# Patient Record
Sex: MALE | Race: WHITE | NOT HISPANIC OR LATINO
[De-identification: names, ages, dates, MRNs, and addresses within clinical notes are randomized per-mention and may not be internally consistent; named-entity substitution may affect disease eponyms.]

---

## 2023-11-15 ENCOUNTER — APPOINTMENT (OUTPATIENT)
Dept: HEART AND VASCULAR | Facility: CLINIC | Age: 58
End: 2023-11-15
Payer: COMMERCIAL

## 2023-11-15 VITALS
BODY MASS INDEX: 32.2 KG/M2 | HEART RATE: 81 BPM | OXYGEN SATURATION: 97 % | WEIGHT: 230 LBS | DIASTOLIC BLOOD PRESSURE: 101 MMHG | HEIGHT: 71 IN | SYSTOLIC BLOOD PRESSURE: 158 MMHG

## 2023-11-15 VITALS — DIASTOLIC BLOOD PRESSURE: 97 MMHG | SYSTOLIC BLOOD PRESSURE: 161 MMHG

## 2023-11-15 DIAGNOSIS — Z82.49 FAMILY HISTORY OF ISCHEMIC HEART DISEASE AND OTHER DISEASES OF THE CIRCULATORY SYSTEM: ICD-10-CM

## 2023-11-15 DIAGNOSIS — Z78.9 OTHER SPECIFIED HEALTH STATUS: ICD-10-CM

## 2023-11-15 DIAGNOSIS — F17.290 NICOTINE DEPENDENCE, OTHER TOBACCO PRODUCT, UNCOMPLICATED: ICD-10-CM

## 2023-11-15 DIAGNOSIS — Z83.3 FAMILY HISTORY OF DIABETES MELLITUS: ICD-10-CM

## 2023-11-15 PROBLEM — Z00.00 ENCOUNTER FOR PREVENTIVE HEALTH EXAMINATION: Status: ACTIVE | Noted: 2023-11-15

## 2023-11-15 PROCEDURE — 93000 ELECTROCARDIOGRAM COMPLETE: CPT

## 2023-11-15 PROCEDURE — 99204 OFFICE O/P NEW MOD 45 MIN: CPT | Mod: 25

## 2023-11-15 RX ORDER — LOSARTAN POTASSIUM 50 MG/1
50 TABLET, FILM COATED ORAL DAILY
Qty: 90 | Refills: 3 | Status: ACTIVE | COMMUNITY
Start: 2023-11-15 | End: 1900-01-01

## 2023-12-15 ENCOUNTER — RESULT REVIEW (OUTPATIENT)
Age: 58
End: 2023-12-15

## 2023-12-18 ENCOUNTER — RESULT REVIEW (OUTPATIENT)
Age: 58
End: 2023-12-18

## 2023-12-27 ENCOUNTER — APPOINTMENT (OUTPATIENT)
Dept: HEART AND VASCULAR | Facility: CLINIC | Age: 58
End: 2023-12-27
Payer: COMMERCIAL

## 2023-12-27 VITALS
HEART RATE: 70 BPM | SYSTOLIC BLOOD PRESSURE: 138 MMHG | WEIGHT: 215 LBS | DIASTOLIC BLOOD PRESSURE: 93 MMHG | BODY MASS INDEX: 30.1 KG/M2 | HEIGHT: 71 IN

## 2023-12-27 PROCEDURE — 99214 OFFICE O/P EST MOD 30 MIN: CPT | Mod: 25

## 2023-12-27 PROCEDURE — 93000 ELECTROCARDIOGRAM COMPLETE: CPT

## 2023-12-27 PROCEDURE — 93306 TTE W/DOPPLER COMPLETE: CPT

## 2023-12-27 RX ORDER — METOPROLOL SUCCINATE 25 MG/1
25 TABLET, EXTENDED RELEASE ORAL DAILY
Qty: 90 | Refills: 3 | Status: ACTIVE | COMMUNITY
Start: 2023-12-14 | End: 1900-01-01

## 2023-12-27 RX ORDER — ROSUVASTATIN CALCIUM 40 MG/1
40 TABLET, FILM COATED ORAL
Qty: 90 | Refills: 3 | Status: ACTIVE | COMMUNITY
Start: 2023-12-27 | End: 1900-01-01

## 2023-12-27 RX ORDER — ASPIRIN 81 MG/1
81 TABLET, CHEWABLE ORAL
Qty: 90 | Refills: 3 | Status: ACTIVE | COMMUNITY
Start: 2023-12-27 | End: 1900-01-01

## 2023-12-27 NOTE — HISTORY OF PRESENT ILLNESS
[FreeTextEntry1] : 58 year old male, occasional cigar smoker with PMHX of HTN ( not medicated ) with FMHX of MI / DM ( father passed 73 yo ) here to establish care.   Patient was recently noted for elevated blood pressure and requires annual clearance to work as an . He currently does not have a PCP. Previous PCP Dr Vivek Tafoya recently retired. He had recent blood work done about 3 months.   Patient is pretty active. He is currently swimming twice a week for about 20 minutes. He can get out of breath if rushing 3 flights of stairs. He has no associated chest pains. He has no edema or orthopnea. He has no recent bleeding or recent illnesses.   Previous Work up  LABS ( 06/2023 ): Total Chol 214, HDL 36,

## 2023-12-27 NOTE — DISCUSSION/SUMMARY
[FreeTextEntry1] : 58 year old male, occasional cigar smoker with PMHX of HTN ( not medicated ) with FMHX of MI / DM ( father passed 73 yo ) here to establish care.  EKG: NSR 81/min CCTA:  Total Agatston Score: 113 .  LM Agatston Score: 0 . LAD Agatston Score: 48 . LCX Agatston Score: 22. RCA Agatston Score: 43.  FERRER Percentile Rank: 70 (The observed calcium score of 113 is at percentile 70 for subjects of the same age, gender, and race/ethnicity who are free of clinical cardiovascular disease and treated diabetes.)  B: Coronary Artery CT Angiogram  Coronary Dominance: Right Yes Anomalous coronary artery:  No Coronary fistula: No  Coronary Arteries (segment number):  LM (5): Normal LAD: Prox (6): Moderate stenosis secondary to noncalcific and calcific plaque. Mid (7): Mild stenosis due to noncalcific plaque Distal (8): Moderate stenosis due to noncalcific plaque D1 (9): High takeoff. Nonobstructive disease. D2 (10): Normal  LCX: Prox (11): Mild stenosis due to noncalcific and calcific plaque OM1(12): Mild stenosis due to noncalcific and calcific plaque Mid (13): Mild stenosis due to noncalcific plaque OM2 (14): n/a  LPDA (15): n/a RI (17): n/a LPL (18): n/a  RCA: Prox (1): Moderate stenosis due to noncalcific and calcific plaque. Positive remodeling noted. Mid (2): Moderate stenosis due to predominantly noncalcific plaque Distal (3): Moderate stenosis due to predominantly noncalcific plaque RPDA (4): Nonobstructive disease. RPL (16): Small vessel.    {% area stenosis:   Normal = 0%;  Minimal = 1 to 29%;  Mild = 30 to 49%;  Moderate= 50 to 69%;  Severe= 70 to 90%; Subtotal > or = 91%     } Lesion type:  Calcified;  Noncalcified;  Predominantly Calcified;  Predominantly Noncalcified;  With Remodeling  C: Additional Cardiac Findings Coarctation: N Patent ductus arteriosus: N Aortic Dissection:  None in the visualized segments of the thoracic aorta. Myocardial structural abnormality: LV:    N LA:    N RV:   N RA:   N Atrial septum:  N Ventricular septum:  N Pericardium:  N Pericardial effusion: N Myocardial Infarction (myocardial thinning and low-attenuation):  Non Cardiac Findings:  Limited CT of the CHEST with and without contrast  INDICATION: Chest pain.  TECHNIQUE: CT of the medial aspects of the mid to lower chest was obtained in conjunction with a coronary CT angiogram.  The coronary CT angiogram is dictated separately.  This report pertains solely to the non-coronary portions of the study.  CONTRAST: Rapid bolused uneventful intravenous administration of 65 cc of Isovue-370. 0 cc were discarded.  PRIOR STUDIES: None.  FINDINGS: The heart is normal in size.  No pericardial effusion is seen.   The visualized portions of the aorta are unremarkable.   The visualized portions of the pulmonary arteries are unremarkable. No gross mediastinal or hilar adenopathy identified.  No pleural effusions are evident. No pulmonary abnormalities are seen.  Upper abdomen and soft tissues demonstrate mild hepatic steatosis otherwise unremarkable enhanced appearance. Osseous structures are intact.  IMPRESSION:  Cardiac: 1.  The calcium score is moderate at 113 Agatston units, which is at the 70 percentile, adjusted for age, gender and race. 2.  Moderate stenosis in the proximal and distal LAD. 3.  Moderate stenosis in the proximal RCA with positive remodeling features. 4.  Moderate stenosis in the mid and distal RCA. 5.  Normal Left Main. 6.  Nonobstructive disease in the LCX.  Based on the coronary findings, FFR-CT Coronary Analysis ([employer]) is recommended to define the physiologic  significance of obstructive coronary disease. After insurance approval, images will be sent for analysis.  Results will then be available in 24 hours( if technical quality of the images is sufficient).    Non-cardiac: 1. Mild hepatic steatosis otherwise unremarkable examination.  IMPRESSION: The results of the CT-FFR analysis are:  LAD: 0.93 Proximal (1); 0.66 distal (3) LCx: 0.92 (1) RCA: 0.99 proximal (1); 0.97 mid (1); 0.95 distal (1)   CAD/WARNER - CCS 3 - ang. equivalent ?- start ASA 81mg po daily and Plavix 75mg po daily. r/b/a of L/PCI d/w pt including, but not ltd to death, MI, CVA/ Pt wishes to proceed. cont metoprolol 25mg po daily. HTN: cont losartan 50mg po daily. HLD: - start rosuvastatin 40mg po daily RTC 3 weeks

## 2023-12-27 NOTE — REVIEW OF SYSTEMS
[Dyspnea on exertion] : dyspnea during exertion [Fever] : no fever [Chills] : no chills [Blurry Vision] : no blurred vision [Earache] : no earache [Chest Discomfort] : no chest discomfort [Lower Ext Edema] : no extremity edema [Palpitations] : no palpitations [Cough] : no cough [Abdominal Pain] : no abdominal pain [Nausea] : no nausea [Vomiting] : no vomiting [Diarrhea] : diarrhea [Urinary Frequency] : no change in urinary frequency [Joint Pain] : no joint pain [Rash] : no rash [Dizziness] : no dizziness [Numbness (Hypoesthesia)] : no numbness [Weakness] : no weakness [Speech Disturbance] : no speech disturbance [Confusion] : no confusion was observed [Easy Bleeding] : no tendency for easy bleeding

## 2024-01-11 VITALS
OXYGEN SATURATION: 99 % | SYSTOLIC BLOOD PRESSURE: 139 MMHG | HEART RATE: 66 BPM | WEIGHT: 250 LBS | TEMPERATURE: 97 F | DIASTOLIC BLOOD PRESSURE: 85 MMHG | HEIGHT: 64 IN | RESPIRATION RATE: 16 BRPM

## 2024-01-11 RX ORDER — CHLORHEXIDINE GLUCONATE 213 G/1000ML
1 SOLUTION TOPICAL ONCE
Refills: 0 | Status: DISCONTINUED | OUTPATIENT
Start: 2024-01-16 | End: 2024-01-30

## 2024-01-11 NOTE — H&P ADULT - ASSESSMENT
59 y/o M, with family hx of MI/DM (father), and PMHx of HTN, who presented to his cardiologist, Dr. Kunz, for annual clearance to work as an . Patient is pretty active - he currently swims twice a week for about 20 minutes. Patient also complains of getting out of breath if rushing 3 flights of stairs. He has no associated CP. CCTA as per MD note: Calcium score is moderate at 113 Agatston units, which is at the 70th percentile, adjusted for age, gender, and race. moderate stenosis in the proximal and distal LAD. Moderate stenosis in the proximal RCA with positive remodeling features. Moderate stenosis in the mid and distal RCA. Normal LM. Nonobstructive disease in the LCx. In light of patient's risk factors, CCS class II angina equivalent symptoms, and abnormal CCTA, pt to undergo cardiac catheterization with possible intervention if clinically indicated.     EKG: 	NSR 66bpm		  ASA 	III  Mallampati class: III  Anginal Class: III    -No Known Allergies    -H/H = 15.5/46.4  . Pt denies BRBPR, hematuria, hematochezia, melena. Pt endorses compliance w/ home ASA, stating last dose was 1/16. Pt loaded w/ Plavix 600mg x1  -BUN/Cr = 14/0.83  . EF unknown. Euvolemic on exam. IV NS @ 250cc bolus followed by 75cc/hr x 2hrs started pre procedure    Sedation Plan:   Moderate  Patient Is Suitable Candidate For Sedation?     Yes    Risks & benefits of procedure and alternative therapy have been explained to the patient including but not limited to: allergic reaction, bleeding with possible need for blood transfusion, infection, renal and vascular compromise, limb damage, arrhythmia, stroke, vessel dissection/perforation, myocardial infarction, and emergent CABG. Informed consent obtained at bedside and included in chart. 57 y/o M, with family hx of MI/DM (father), and PMHx of HTN, who presented to his cardiologist, Dr. Kunz, for annual clearance to work as an . Patient is pretty active - he currently swims twice a week for about 20 minutes. Patient also complains of getting out of breath if rushing 3 flights of stairs. He has no associated CP. CCTA as per MD note: Calcium score is moderate at 113 Agatston units, which is at the 70th percentile, adjusted for age, gender, and race. moderate stenosis in the proximal and distal LAD. Moderate stenosis in the proximal RCA with positive remodeling features. Moderate stenosis in the mid and distal RCA. Normal LM. Nonobstructive disease in the LCx. In light of patient's risk factors, CCS class II angina equivalent symptoms, and abnormal CCTA, pt to undergo cardiac catheterization with possible intervention if clinically indicated.     EKG: 	NSR 66bpm		  ASA 	III  Mallampati class: III  Anginal Class: III    -No Known Allergies    -H/H = 15.5/46.4  . Pt denies BRBPR, hematuria, hematochezia, melena. Pt endorses compliance w/ home ASA, stating last dose was 1/16. Pt loaded w/ Plavix 600mg x1  -BUN/Cr = 14/0.83  . EF unknown. Euvolemic on exam. IV NS @ 250cc bolus followed by 75cc/hr x 2hrs started pre procedure    Sedation Plan:   Moderate  Patient Is Suitable Candidate For Sedation?     Yes    Risks & benefits of procedure and alternative therapy have been explained to the patient including but not limited to: allergic reaction, bleeding with possible need for blood transfusion, infection, renal and vascular compromise, limb damage, arrhythmia, stroke, vessel dissection/perforation, myocardial infarction, and emergent CABG. Informed consent obtained at bedside and included in chart. 57 y/o M, with family hx of MI/DM (father), and PMHx of HTN, who presented to his cardiologist, Dr. Kunz, for annual clearance to work as an . Patient is pretty active - he currently swims twice a week for about 20 minutes. Patient also complains of getting out of breath if rushing 3 flights of stairs. He has no associated CP. CCTA as per MD note: Calcium score is moderate at 113 Agatston units, which is at the 70th percentile, adjusted for age, gender, and race. moderate stenosis in the proximal and distal LAD. Moderate stenosis in the proximal RCA with positive remodeling features. Moderate stenosis in the mid and distal RCA. Normal LM. Nonobstructive disease in the LCx. In light of patient's risk factors, CCS class II angina equivalent symptoms, and abnormal CCTA, pt to undergo cardiac catheterization with possible intervention if clinically indicated.     EKG: 	NSR 66bpm		  ASA 	III  Mallampati class: III  Anginal Class: III    -No Known Allergies    -H/H = 15.5/46.4  . Pt denies BRBPR, hematuria, hematochezia, melena. Pt endorses compliance w/ home ASA, stating last dose was 1/16. Pt loaded w/ Plavix 600mg x1  Meds confirmed with pharmacy. Pt stating he took all 5 of his meds this morning, however unsure if Plavix is one of them. Discussed with fellow, load with 600mg  -BUN/Cr = 14/0.83  . EF unknown. Euvolemic on exam. IV NS @ 250cc bolus followed by 75cc/hr x 2hrs started pre procedure    Sedation Plan:   Moderate  Patient Is Suitable Candidate For Sedation?     Yes    Risks & benefits of procedure and alternative therapy have been explained to the patient including but not limited to: allergic reaction, bleeding with possible need for blood transfusion, infection, renal and vascular compromise, limb damage, arrhythmia, stroke, vessel dissection/perforation, myocardial infarction, and emergent CABG. Informed consent obtained at bedside and included in chart. 57 y/o M, with family hx of MI/DM (father), and PMHx of HTN, who presented to his cardiologist, Dr. Kunz, for annual clearance to work as an . Patient is pretty active - he currently swims twice a week for about 20 minutes. Patient also complains of getting out of breath if rushing 3 flights of stairs. He has no associated CP. CCTA as per MD note: Calcium score is moderate at 113 Agatston units, which is at the 70th percentile, adjusted for age, gender, and race. moderate stenosis in the proximal and distal LAD. Moderate stenosis in the proximal RCA with positive remodeling features. Moderate stenosis in the mid and distal RCA. Normal LM. Nonobstructive disease in the LCx. In light of patient's risk factors, CCS class III angina equivalent symptoms, and abnormal CCTA, pt to undergo cardiac catheterization with possible intervention if clinically indicated.     EKG: 	NSR 66bpm		  ASA 	III  Mallampati class: III  Anginal Class: III    -No Known Allergies    -H/H = 15.5/46.4  . Pt denies BRBPR, hematuria, hematochezia, melena. Pt endorses compliance w/ home ASA, stating last dose was 1/16. Pt loaded w/ Plavix 600mg x1  Meds confirmed with pharmacy. Pt stating he took all 5 of his meds this morning, however unsure if Plavix is one of them. Discussed with fellow, load with 600mg  -BUN/Cr = 14/0.83  . EF unknown. Euvolemic on exam. IV NS @ 250cc bolus followed by 75cc/hr x 2hrs started pre procedure    Sedation Plan:   Moderate  Patient Is Suitable Candidate For Sedation?     Yes    Risks & benefits of procedure and alternative therapy have been explained to the patient including but not limited to: allergic reaction, bleeding with possible need for blood transfusion, infection, renal and vascular compromise, limb damage, arrhythmia, stroke, vessel dissection/perforation, myocardial infarction, and emergent CABG. Informed consent obtained at bedside and included in chart. 59 y/o M, with family hx of MI/DM (father), and PMHx of HTN, who presented to his cardiologist, Dr. Kunz, for annual clearance to work as an . Patient is pretty active - he currently swims twice a week for about 20 minutes. Patient also complains of getting out of breath if rushing 3 flights of stairs. He has no associated CP. CCTA as per MD note: Calcium score is moderate at 113 Agatston units, which is at the 70th percentile, adjusted for age, gender, and race. moderate stenosis in the proximal and distal LAD. Moderate stenosis in the proximal RCA with positive remodeling features. Moderate stenosis in the mid and distal RCA. Normal LM. Nonobstructive disease in the LCx. In light of patient's risk factors, CCS class III angina equivalent symptoms, and abnormal CCTA, pt to undergo cardiac catheterization with possible intervention if clinically indicated.     EKG: 	NSR 66bpm		  ASA 	III  Mallampati class: III  Anginal Class: III    -No Known Allergies    -H/H = 15.5/46.4  . Pt denies BRBPR, hematuria, hematochezia, melena. Pt endorses compliance w/ home ASA, stating last dose was 1/16. Pt loaded w/ Plavix 600mg x1  Meds confirmed with pharmacy. Pt stating he took all 5 of his meds this morning, however unsure if Plavix is one of them. Discussed with fellow, load with 600mg  -BUN/Cr = 14/0.83  . EF unknown. Euvolemic on exam. IV NS @ 250cc bolus followed by 75cc/hr x 2hrs started pre procedure    Sedation Plan:   Moderate  Patient Is Suitable Candidate For Sedation?     Yes    Risks & benefits of procedure and alternative therapy have been explained to the patient including but not limited to: allergic reaction, bleeding with possible need for blood transfusion, infection, renal and vascular compromise, limb damage, arrhythmia, stroke, vessel dissection/perforation, myocardial infarction, and emergent CABG. Informed consent obtained at bedside and included in chart.

## 2024-01-11 NOTE — H&P ADULT - NSHPLABSRESULTS_GEN_ALL_CORE
15.5   5.54  )-----------( 172      ( 16 Jan 2024 06:58 )             46.4       01-16    144  |  107  |  14  ----------------------------<  93  4.0   |  28  |  0.83    Ca    9.5      16 Jan 2024 06:58    TPro  7.1  /  Alb  4.5  /  TBili  0.7  /  DBili  x   /  AST  22  /  ALT  39  /  AlkPhos  68  01-16      PT/INR - ( 16 Jan 2024 06:58 )   PT: 12.1 sec;   INR: 1.06          PTT - ( 16 Jan 2024 06:58 )  PTT:33.7 sec    CARDIAC MARKERS ( 16 Jan 2024 06:58 )  x     / x     / 144 U/L / x     / 4.7 ng/mL        Urinalysis Basic - ( 16 Jan 2024 06:58 )    Color: x / Appearance: x / SG: x / pH: x  Gluc: 93 mg/dL / Ketone: x  / Bili: x / Urobili: x   Blood: x / Protein: x / Nitrite: x   Leuk Esterase: x / RBC: x / WBC x   Sq Epi: x / Non Sq Epi: x / Bacteria: x

## 2024-01-11 NOTE — H&P ADULT - NSICDXFAMILYHX_GEN_ALL_CORE_FT
FAMILY HISTORY:  Father  Still living? Unknown  FH: hypertension, Age at diagnosis: Age Unknown  FH: myocardial infarction, Age at diagnosis: Age Unknown  FHx: diabetes mellitus, Age at diagnosis: Age Unknown    Mother  Still living? Unknown  FH: COPD (chronic obstructive pulmonary disease), Age at diagnosis: Age Unknown

## 2024-01-11 NOTE — H&P ADULT - NS ATTEND AMEND GEN_ALL_CORE FT
57 y/o M, with family hx of MI/DM (father), and PMHx of HTN, who presented to his cardiologist, Dr. Kunz, for annual clearance to work as an . Patient is pretty active - he currently swims twice a week for about 20 minutes. Patient also complains of getting out of breath if rushing 3 flights of stairs. He has no associated CP. CCTA as per MD note: Calcium score is moderate at 113 Agatston units, which is at the 70th percentile, adjusted for age, gender, and race. moderate stenosis in the proximal and distal LAD. Moderate stenosis in the proximal RCA with positive remodeling features. Moderate stenosis in the mid and distal RCA. Normal LM. Nonobstructive disease in the LCx. In light of patient's risk factors, CCS class III angina equivalent symptoms, and abnormal CCTA, pt to undergo cardiac catheterization with possible intervention if clinically indicated. 59 y/o M, with family hx of MI/DM (father), and PMHx of HTN, who presented to his cardiologist, Dr. Kunz, for annual clearance to work as an . Patient is pretty active - he currently swims twice a week for about 20 minutes. Patient also complains of getting out of breath if rushing 3 flights of stairs. He has no associated CP. CCTA as per MD note: Calcium score is moderate at 113 Agatston units, which is at the 70th percentile, adjusted for age, gender, and race. moderate stenosis in the proximal and distal LAD. Moderate stenosis in the proximal RCA with positive remodeling features. Moderate stenosis in the mid and distal RCA. Normal LM. Nonobstructive disease in the LCx. In light of patient's risk factors, CCS class III angina equivalent symptoms, and abnormal CCTA, pt to undergo cardiac catheterization with possible intervention if clinically indicated.

## 2024-01-11 NOTE — H&P ADULT - HISTORY OF PRESENT ILLNESS
Cardiologist: Dr. Kunz   Pharmacy:   Escort:     Pt is a 57 y/o M, with family hx of MI/DM (father), and PMHx of HTN, who presented to his cardiologist, Dr. Kunz, for annual clearance to work as an . Patient is pretty active - he currently swims twice a week for about 20 minutes. Patient also complains of getting out of breath if rushing 3 flights of stairs. He has no associated CP.  Patient denies  SOB, palpitations, orthopnea, LE edema, syncope, n/v, dizziness, diaphoresis, claudication, fever, chills, recent travel, or sick contacts.  CCTA as per MD note: Calcium score is moderate at 113 Agatston units, which is at the 70th percentile, adjusted for age, gender, and race. moderate stenosis in the proximal and distal LAD. Moderate stenosis in the proximal RCA with positive remodeling features. Moderate stenosis in the mid and distal RCA. Normal LM. Nonobstructive disease in the LCx.     In light of patient's risk factors, CCS class II angina equivalent symptoms, and abnormal CCTA, pt to undergo cardiac catheterization with possible intervention if clinically indicated.  Cardiologist: Dr. Kunz   Pharmacy:   Escort:     Pt is a 59 y/o M, with family hx of MI/DM (father), and PMHx of HTN, who presented to his cardiologist, Dr. Kunz, for annual clearance to work as an . Patient is pretty active - he currently swims twice a week for about 20 minutes. Patient also complains of getting out of breath if rushing 3 flights of stairs. He has no associated CP.  Patient denies  SOB, palpitations, orthopnea, LE edema, syncope, n/v, dizziness, diaphoresis, claudication, fever, chills, recent travel, or sick contacts.  CCTA as per MD note: Calcium score is moderate at 113 Agatston units, which is at the 70th percentile, adjusted for age, gender, and race. moderate stenosis in the proximal and distal LAD. Moderate stenosis in the proximal RCA with positive remodeling features. Moderate stenosis in the mid and distal RCA. Normal LM. Nonobstructive disease in the LCx.     In light of patient's risk factors, CCS class II angina equivalent symptoms, and abnormal CCTA, pt to undergo cardiac catheterization with possible intervention if clinically indicated.  Cardiologist: Dr. Kunz   Pharmacy:  [facility]    59 y/o M, with family hx of MI/DM (father), and PMHx of HTN, who presented to his cardiologist, Dr. Kunz, for annual clearance to work as an . Patient is pretty active - he currently swims twice a week for about 20 minutes. Patient also complains of getting out of breath if rushing 3 flights of stairs. He has no associated CP.  Patient denies  SOB, palpitations, orthopnea, LE edema, syncope, n/v, dizziness, diaphoresis, claudication, fever, chills, recent travel, or sick contacts.  CCTA as per MD note: Calcium score is moderate at 113 Agatston units, which is at the 70th percentile, adjusted for age, gender, and race. moderate stenosis in the proximal and distal LAD. Moderate stenosis in the proximal RCA with positive remodeling features. Moderate stenosis in the mid and distal RCA. Normal LM. Nonobstructive disease in the LCx.     In light of patient's risk factors, CCS class II angina equivalent symptoms, and abnormal CCTA, pt to undergo cardiac catheterization with possible intervention if clinically indicated.  Cardiologist: Dr. Kunz   Pharmacy:  [facility]    57 y/o M, with family hx of MI/DM (father), and PMHx of HTN, who presented to his cardiologist, Dr. Kunz, for annual clearance to work as an . Patient is pretty active - he currently swims twice a week for about 20 minutes. Patient also complains of getting out of breath if rushing 3 flights of stairs. He has no associated CP.  Patient denies  SOB, palpitations, orthopnea, LE edema, syncope, n/v, dizziness, diaphoresis, claudication, fever, chills, recent travel, or sick contacts.  CCTA as per MD note: Calcium score is moderate at 113 Agatston units, which is at the 70th percentile, adjusted for age, gender, and race. moderate stenosis in the proximal and distal LAD. Moderate stenosis in the proximal RCA with positive remodeling features. Moderate stenosis in the mid and distal RCA. Normal LM. Nonobstructive disease in the LCx.     In light of patient's risk factors, CCS class II angina equivalent symptoms, and abnormal CCTA, pt to undergo cardiac catheterization with possible intervention if clinically indicated.  Cardiologist: Dr. Kunz   Pharmacy:  [facility]    59 y/o M, with family hx of MI/DM (father), and PMHx of HTN, who presented to his cardiologist, Dr. Kunz, for annual clearance to work as an . Patient is pretty active - he currently swims twice a week for about 20 minutes. Patient also complains of getting out of breath if rushing 3 flights of stairs. He has no associated CP.  Patient denies  SOB, palpitations, orthopnea, LE edema, syncope, n/v, dizziness, diaphoresis, claudication, fever, chills, recent travel, or sick contacts.  CCTA as per MD note: Calcium score is moderate at 113 Agatston units, which is at the 70th percentile, adjusted for age, gender, and race. moderate stenosis in the proximal and distal LAD. Moderate stenosis in the proximal RCA with positive remodeling features. Moderate stenosis in the mid and distal RCA. Normal LM. Nonobstructive disease in the LCx.     In light of patient's risk factors, CCS class II angina equivalent symptoms, and abnormal CCTA, pt to undergo cardiac catheterization with possible intervention if clinically indicated.  Cardiologist: Dr. Kunz   Pharmacy:  [facility]    59 y/o M, with family hx of MI/DM (father), and PMHx of HTN, who presented to his cardiologist, Dr. Kunz, for annual clearance to work as an . Patient is pretty active - he currently swims twice a week for about 20 minutes. Patient also complains of getting out of breath if rushing 3 flights of stairs. He has no associated CP.  Patient denies  SOB, palpitations, orthopnea, LE edema, syncope, n/v, dizziness, diaphoresis, claudication, fever, chills, recent travel, or sick contacts.  CCTA as per MD note: Calcium score is moderate at 113 Agatston units, which is at the 70th percentile, adjusted for age, gender, and race. moderate stenosis in the proximal and distal LAD. Moderate stenosis in the proximal RCA with positive remodeling features. Moderate stenosis in the mid and distal RCA. Normal LM. Nonobstructive disease in the LCx.     In light of patient's risk factors, CCS class II angina equivalent symptoms, and abnormal CCTA, pt to undergo cardiac catheterization with possible intervention if clinically indicated.

## 2024-01-16 ENCOUNTER — OUTPATIENT (OUTPATIENT)
Dept: OUTPATIENT SERVICES | Facility: HOSPITAL | Age: 59
LOS: 1 days | Discharge: HOME | End: 2024-01-16
Payer: COMMERCIAL

## 2024-01-16 LAB
A1C WITH ESTIMATED AVERAGE GLUCOSE RESULT: 5.3 % — SIGNIFICANT CHANGE UP (ref 4–5.6)
ALBUMIN SERPL ELPH-MCNC: 4.5 G/DL — SIGNIFICANT CHANGE UP (ref 3.3–5)
ALP SERPL-CCNC: 68 U/L — SIGNIFICANT CHANGE UP (ref 40–120)
ALT FLD-CCNC: 39 U/L — SIGNIFICANT CHANGE UP (ref 10–45)
ANION GAP SERPL CALC-SCNC: 9 MMOL/L — SIGNIFICANT CHANGE UP (ref 5–17)
APTT BLD: 33.7 SEC — SIGNIFICANT CHANGE UP (ref 24.5–35.6)
AST SERPL-CCNC: 22 U/L — SIGNIFICANT CHANGE UP (ref 10–40)
BASOPHILS # BLD AUTO: 0.04 K/UL — SIGNIFICANT CHANGE UP (ref 0–0.2)
BASOPHILS NFR BLD AUTO: 0.7 % — SIGNIFICANT CHANGE UP (ref 0–2)
BILIRUB SERPL-MCNC: 0.7 MG/DL — SIGNIFICANT CHANGE UP (ref 0.2–1.2)
BUN SERPL-MCNC: 14 MG/DL — SIGNIFICANT CHANGE UP (ref 7–23)
CALCIUM SERPL-MCNC: 9.5 MG/DL — SIGNIFICANT CHANGE UP (ref 8.4–10.5)
CHLORIDE SERPL-SCNC: 107 MMOL/L — SIGNIFICANT CHANGE UP (ref 96–108)
CHOLEST SERPL-MCNC: 93 MG/DL — SIGNIFICANT CHANGE UP
CK MB CFR SERPL CALC: 4.7 NG/ML — SIGNIFICANT CHANGE UP (ref 0–6.7)
CK SERPL-CCNC: 144 U/L — SIGNIFICANT CHANGE UP (ref 30–200)
CO2 SERPL-SCNC: 28 MMOL/L — SIGNIFICANT CHANGE UP (ref 22–31)
COHGB MFR BLDV: 0.5 % — SIGNIFICANT CHANGE UP
CREAT SERPL-MCNC: 0.83 MG/DL — SIGNIFICANT CHANGE UP (ref 0.5–1.3)
EGFR: 100 ML/MIN/1.73M2 — SIGNIFICANT CHANGE UP
EOSINOPHIL # BLD AUTO: 0.11 K/UL — SIGNIFICANT CHANGE UP (ref 0–0.5)
EOSINOPHIL NFR BLD AUTO: 2 % — SIGNIFICANT CHANGE UP (ref 0–6)
ESTIMATED AVERAGE GLUCOSE: 105 MG/DL — SIGNIFICANT CHANGE UP (ref 68–114)
GLUCOSE SERPL-MCNC: 93 MG/DL — SIGNIFICANT CHANGE UP (ref 70–99)
HCT VFR BLD CALC: 46.4 % — SIGNIFICANT CHANGE UP (ref 39–50)
HCT VFR BLDA CALC: 47 % — SIGNIFICANT CHANGE UP
HDLC SERPL-MCNC: 39 MG/DL — LOW
HGB BLD CALC-MCNC: 15.7 G/DL — SIGNIFICANT CHANGE UP (ref 12.6–17.4)
HGB BLD-MCNC: 15.5 G/DL — SIGNIFICANT CHANGE UP (ref 13–17)
IMM GRANULOCYTES NFR BLD AUTO: 0.5 % — SIGNIFICANT CHANGE UP (ref 0–0.9)
INR BLD: 1.06 — SIGNIFICANT CHANGE UP (ref 0.85–1.18)
ISTAT ACTK (ACTIVATED CLOTTING TIME KAOLIN): 260 SEC — HIGH (ref 74–137)
ISTAT INR: 1.2 — HIGH (ref 0.88–1.16)
ISTAT PT: 14 SEC — HIGH (ref 10–12.9)
LIPID PNL WITH DIRECT LDL SERPL: 33 MG/DL — SIGNIFICANT CHANGE UP
LYMPHOCYTES # BLD AUTO: 1.47 K/UL — SIGNIFICANT CHANGE UP (ref 1–3.3)
LYMPHOCYTES # BLD AUTO: 26.5 % — SIGNIFICANT CHANGE UP (ref 13–44)
MCHC RBC-ENTMCNC: 27.7 PG — SIGNIFICANT CHANGE UP (ref 27–34)
MCHC RBC-ENTMCNC: 33.4 GM/DL — SIGNIFICANT CHANGE UP (ref 32–36)
MCV RBC AUTO: 83 FL — SIGNIFICANT CHANGE UP (ref 80–100)
METHGB MFR BLDV: 0 % — SIGNIFICANT CHANGE UP
MONOCYTES # BLD AUTO: 0.51 K/UL — SIGNIFICANT CHANGE UP (ref 0–0.9)
MONOCYTES NFR BLD AUTO: 9.2 % — SIGNIFICANT CHANGE UP (ref 2–14)
NEUTROPHILS # BLD AUTO: 3.38 K/UL — SIGNIFICANT CHANGE UP (ref 1.8–7.4)
NEUTROPHILS NFR BLD AUTO: 61.1 % — SIGNIFICANT CHANGE UP (ref 43–77)
NON HDL CHOLESTEROL: 54 MG/DL — SIGNIFICANT CHANGE UP
NRBC # BLD: 0 /100 WBCS — SIGNIFICANT CHANGE UP (ref 0–0)
PLATELET # BLD AUTO: 172 K/UL — SIGNIFICANT CHANGE UP (ref 150–400)
POCT ISTAT CREATININE: 0.8 MG/DL — SIGNIFICANT CHANGE UP (ref 0.5–1.3)
POTASSIUM SERPL-MCNC: 4 MMOL/L — SIGNIFICANT CHANGE UP (ref 3.5–5.3)
POTASSIUM SERPL-SCNC: 4 MMOL/L — SIGNIFICANT CHANGE UP (ref 3.5–5.3)
PROT SERPL-MCNC: 7.1 G/DL — SIGNIFICANT CHANGE UP (ref 6–8.3)
PROTHROM AB SERPL-ACNC: 12.1 SEC — SIGNIFICANT CHANGE UP (ref 9.5–13)
RBC # BLD: 5.59 M/UL — SIGNIFICANT CHANGE UP (ref 4.2–5.8)
RBC # FLD: 13.3 % — SIGNIFICANT CHANGE UP (ref 10.3–14.5)
SAO2 % BLDV: 52 % — LOW (ref 67–88)
SODIUM SERPL-SCNC: 144 MMOL/L — SIGNIFICANT CHANGE UP (ref 135–145)
TRIGL SERPL-MCNC: 106 MG/DL — SIGNIFICANT CHANGE UP
WBC # BLD: 5.54 K/UL — SIGNIFICANT CHANGE UP (ref 3.8–10.5)
WBC # FLD AUTO: 5.54 K/UL — SIGNIFICANT CHANGE UP (ref 3.8–10.5)

## 2024-01-16 PROCEDURE — 93571 IV DOP VEL&/PRESS C FLO 1ST: CPT | Mod: 26,LD

## 2024-01-16 PROCEDURE — 82553 CREATINE MB FRACTION: CPT

## 2024-01-16 PROCEDURE — 93571 IV DOP VEL&/PRESS C FLO 1ST: CPT | Mod: LD

## 2024-01-16 PROCEDURE — 85025 COMPLETE CBC W/AUTO DIFF WBC: CPT

## 2024-01-16 PROCEDURE — C1894: CPT

## 2024-01-16 PROCEDURE — 82565 ASSAY OF CREATININE: CPT

## 2024-01-16 PROCEDURE — C1887: CPT

## 2024-01-16 PROCEDURE — 93458 L HRT ARTERY/VENTRICLE ANGIO: CPT | Mod: 26

## 2024-01-16 PROCEDURE — 85730 THROMBOPLASTIN TIME PARTIAL: CPT

## 2024-01-16 PROCEDURE — 80061 LIPID PANEL: CPT

## 2024-01-16 PROCEDURE — 85347 COAGULATION TIME ACTIVATED: CPT

## 2024-01-16 PROCEDURE — 82550 ASSAY OF CK (CPK): CPT

## 2024-01-16 PROCEDURE — 85610 PROTHROMBIN TIME: CPT

## 2024-01-16 PROCEDURE — 83036 HEMOGLOBIN GLYCOSYLATED A1C: CPT

## 2024-01-16 PROCEDURE — 93010 ELECTROCARDIOGRAM REPORT: CPT

## 2024-01-16 PROCEDURE — 93458 L HRT ARTERY/VENTRICLE ANGIO: CPT

## 2024-01-16 PROCEDURE — C1769: CPT

## 2024-01-16 PROCEDURE — 99152 MOD SED SAME PHYS/QHP 5/>YRS: CPT

## 2024-01-16 PROCEDURE — 99153 MOD SED SAME PHYS/QHP EA: CPT

## 2024-01-16 PROCEDURE — 82803 BLOOD GASES ANY COMBINATION: CPT

## 2024-01-16 PROCEDURE — 93005 ELECTROCARDIOGRAM TRACING: CPT

## 2024-01-16 PROCEDURE — 80053 COMPREHEN METABOLIC PANEL: CPT

## 2024-01-16 PROCEDURE — 36415 COLL VENOUS BLD VENIPUNCTURE: CPT

## 2024-01-16 RX ORDER — METOPROLOL TARTRATE 50 MG
1 TABLET ORAL
Refills: 0 | DISCHARGE

## 2024-01-16 RX ORDER — CLOPIDOGREL BISULFATE 75 MG/1
1 TABLET, FILM COATED ORAL
Refills: 0 | DISCHARGE

## 2024-01-16 RX ORDER — ASPIRIN/CALCIUM CARB/MAGNESIUM 324 MG
1 TABLET ORAL
Refills: 0 | DISCHARGE

## 2024-01-16 RX ORDER — SODIUM CHLORIDE 9 MG/ML
500 INJECTION INTRAMUSCULAR; INTRAVENOUS; SUBCUTANEOUS
Refills: 0 | Status: DISCONTINUED | OUTPATIENT
Start: 2024-01-16 | End: 2024-01-30

## 2024-01-16 RX ORDER — LOSARTAN POTASSIUM 100 MG/1
1 TABLET, FILM COATED ORAL
Refills: 0 | DISCHARGE

## 2024-01-16 RX ORDER — SODIUM CHLORIDE 9 MG/ML
250 INJECTION INTRAMUSCULAR; INTRAVENOUS; SUBCUTANEOUS ONCE
Refills: 0 | Status: COMPLETED | OUTPATIENT
Start: 2024-01-16 | End: 2024-01-16

## 2024-01-16 RX ORDER — ROSUVASTATIN CALCIUM 5 MG/1
1 TABLET ORAL
Refills: 0 | DISCHARGE

## 2024-01-16 RX ORDER — SODIUM CHLORIDE 9 MG/ML
500 INJECTION INTRAMUSCULAR; INTRAVENOUS; SUBCUTANEOUS
Refills: 0 | Status: DISCONTINUED | OUTPATIENT
Start: 2024-01-16 | End: 2024-01-16

## 2024-01-16 RX ORDER — ASPIRIN/CALCIUM CARB/MAGNESIUM 324 MG
325 TABLET ORAL ONCE
Refills: 0 | Status: DISCONTINUED | OUTPATIENT
Start: 2024-01-16 | End: 2024-01-16

## 2024-01-16 RX ORDER — CLOPIDOGREL BISULFATE 75 MG/1
600 TABLET, FILM COATED ORAL ONCE
Refills: 0 | Status: COMPLETED | OUTPATIENT
Start: 2024-01-16 | End: 2024-01-16

## 2024-01-16 RX ADMIN — SODIUM CHLORIDE 75 MILLILITER(S): 9 INJECTION INTRAMUSCULAR; INTRAVENOUS; SUBCUTANEOUS at 08:08

## 2024-01-16 RX ADMIN — SODIUM CHLORIDE 250 MILLILITER(S): 9 INJECTION INTRAMUSCULAR; INTRAVENOUS; SUBCUTANEOUS at 08:07

## 2024-01-16 RX ADMIN — CLOPIDOGREL BISULFATE 600 MILLIGRAM(S): 75 TABLET, FILM COATED ORAL at 08:07

## 2024-01-16 RX ADMIN — SODIUM CHLORIDE 250 MILLILITER(S): 9 INJECTION INTRAMUSCULAR; INTRAVENOUS; SUBCUTANEOUS at 11:06

## 2024-01-16 NOTE — PROGRESS NOTE ADULT - SUBJECTIVE AND OBJECTIVE BOX
Interventional Cardiology PA SDA Discharge Note  Patient without complaints. Ambulated and voided without difficulties    Afebrile, VSS  Ext: Right Radial: No hematoma, bleeding, dressing C/D/I		       Pulses:   intact RAD/DP/PT to baseline     A/P:    57 y/o M, with family hx of MI/DM (father), and PMHx of HTN, who presented to his cardiologist, Dr. Kunz, for annual clearance to work as an . CCTA as per MD note: Calcium score is moderate at 113 Agatston units, which is at the 70th percentile, adjusted for age, gender, and race. moderate stenosis in the proximal and distal LAD. Moderate stenosis in the proximal RCA with positive remodeling features. Moderate stenosis in the mid and distal RCA. Normal LM. Nonobstructive disease in the LCx. In light of patient's risk factors, CCS class II angina equivalent symptoms, and abnormal CCTA, pt to undergo cardiac catheterization with possible intervention if clinically indicated.     Patient is now status pos diagnostic cardiac cath 01/116/2024: LM short mild luminal irregularities, mLAD myocardial bridge, dLAD 50-60% (IFR 0.93, FFR 0.84) LCX mild diffuse disease, pRCA 20-30% stenosis, mild diffuse disease. EDP 8 mmHg   Access: RRA DStat      1. Stable for discharge as per attending Dr. Kunz after bed rest, pt voids, right wrist stable and 30 minutes of ambulation.  2. Follow up with cardiologist, Dr. Kunz, in 1-2 weeks.   3. Discharge forms signed and copies in chart.  4. Medications- patient to continue with ASA 81mg daily, Rosuvastatin 40mg qhs, Toprol 25mg daily, and Losartan 50mg daily. Patient to DISCONTINUE PLAVIX 75mg.    Interventional Cardiology PA SDA Discharge Note  Patient without complaints. Ambulated and voided without difficulties    Afebrile, VSS  Ext: Right Radial: No hematoma, bleeding, dressing C/D/I		       Pulses:   intact RAD/DP/PT to baseline     A/P:    59 y/o M, with family hx of MI/DM (father), and PMHx of HTN, who presented to his cardiologist, Dr. Kunz, for annual clearance to work as an . CCTA as per MD note: Calcium score is moderate at 113 Agatston units, which is at the 70th percentile, adjusted for age, gender, and race. moderate stenosis in the proximal and distal LAD. Moderate stenosis in the proximal RCA with positive remodeling features. Moderate stenosis in the mid and distal RCA. Normal LM. Nonobstructive disease in the LCx. In light of patient's risk factors, CCS class II angina equivalent symptoms, and abnormal CCTA, pt to undergo cardiac catheterization with possible intervention if clinically indicated.     Patient is now status pos diagnostic cardiac cath 01/116/2024: LM short mild luminal irregularities, mLAD myocardial bridge, dLAD 50-60% (IFR 0.93, FFR 0.84) LCX mild diffuse disease, pRCA 20-30% stenosis, mild diffuse disease. EDP 8 mmHg   Access: RRA DStat      1. Stable for discharge as per attending Dr. Kunz after bed rest, pt voids, right wrist stable and 30 minutes of ambulation.  2. Follow up with cardiologist, Dr. Kunz, in 1-2 weeks.   3. Discharge forms signed and copies in chart.  4. Medications- patient to continue with ASA 81mg daily, Rosuvastatin 40mg qhs, Toprol 25mg daily, and Losartan 50mg daily. Patient to DISCONTINUE PLAVIX 75mg.

## 2024-01-16 NOTE — PROGRESS NOTE ADULT - NS ATTEND AMEND GEN_ALL_CORE FT
A/P:    59 y/o M, with family hx of MI/DM (father), and PMHx of HTN, who presented to his cardiologist, Dr. Kunz, for annual clearance to work as an . CCTA as per MD note: Calcium score is moderate at 113 Agatston units, which is at the 70th percentile, adjusted for age, gender, and race. moderate stenosis in the proximal and distal LAD. Moderate stenosis in the proximal RCA with positive remodeling features. Moderate stenosis in the mid and distal RCA. Normal LM. Nonobstructive disease in the LCx. In light of patient's risk factors, CCS class II angina equivalent symptoms, and abnormal CCTA, pt to undergo cardiac catheterization with possible intervention if clinically indicated.     Patient is now status pos diagnostic cardiac cath 01/116/2024: LM short mild luminal irregularities, mLAD myocardial bridge, dLAD 50-60% (IFR 0.93, FFR 0.84) LCX mild diffuse disease, pRCA 20-30% stenosis, mild diffuse disease. EDP 8 mmHg   Access: RRA DStat      1. Stable for discharge as per attending Dr. Kunz after bed rest, pt voids, right wrist stable and 30 minutes of ambulation.  2. Follow up with cardiologist, Dr. Kunz, in 1-2 weeks.   3. Discharge forms signed and copies in chart.  4. Medications- patient to continue with ASA 81mg daily, Rosuvastatin 40mg qhs, Toprol 25mg daily, and Losartan 50mg daily. Patient to DISCONTINUE PLAVIX 75mg.

## 2024-01-19 DIAGNOSIS — R94.39 ABNORMAL RESULT OF OTHER CARDIOVASCULAR FUNCTION STUDY: ICD-10-CM

## 2024-01-19 DIAGNOSIS — I25.110 ATHEROSCLEROTIC HEART DISEASE OF NATIVE CORONARY ARTERY WITH UNSTABLE ANGINA PECTORIS: ICD-10-CM

## 2024-01-24 ENCOUNTER — APPOINTMENT (OUTPATIENT)
Dept: HEART AND VASCULAR | Facility: CLINIC | Age: 59
End: 2024-01-24
Payer: COMMERCIAL

## 2024-01-24 VITALS
BODY MASS INDEX: 30.1 KG/M2 | HEART RATE: 73 BPM | DIASTOLIC BLOOD PRESSURE: 78 MMHG | TEMPERATURE: 98 F | HEIGHT: 71 IN | OXYGEN SATURATION: 98 % | WEIGHT: 215 LBS | SYSTOLIC BLOOD PRESSURE: 102 MMHG

## 2024-01-24 PROCEDURE — 99214 OFFICE O/P EST MOD 30 MIN: CPT | Mod: 25

## 2024-01-24 PROCEDURE — 93000 ELECTROCARDIOGRAM COMPLETE: CPT

## 2024-01-24 RX ORDER — CLOPIDOGREL BISULFATE 75 MG/1
75 TABLET, FILM COATED ORAL DAILY
Qty: 90 | Refills: 3 | Status: COMPLETED | COMMUNITY
Start: 2023-12-27 | End: 2024-01-24

## 2024-01-24 NOTE — DISCUSSION/SUMMARY
[FreeTextEntry1] : 58 year old male, occasional cigar smoker with PMHX of HTN ( not medicated ) with FMHX of MI / DM ( father passed 73 yo ) here to establish care.  EKG: NSR 70/min CCTA:  Total Agatston Score: 113 LHC - mLAD 60%, FFR 0.83  CAD- cont primary prevention - cont ASA 81mg po daily cont metoprolol 25mg po daily. HTN: cont losartan 50mg po daily. HLD: - start rosuvastatin 40mg po daily RTC 3 months

## 2024-01-24 NOTE — PHYSICAL EXAM
[Well Developed] : well developed [Well Nourished] : well nourished [No Acute Distress] : no acute distress [No Carotid Bruit] : no carotid bruit [Normal S1, S2] : normal S1, S2 [No Murmur] : no murmur [Good Air Entry] : good air entry [Clear Lung Fields] : clear lung fields [No Respiratory Distress] : no respiratory distress  [Normal Gait] : normal gait [No Edema] : no edema [Moves all extremities] : moves all extremities [No Focal Deficits] : no focal deficits [Normal Speech] : normal speech [Alert and Oriented] : alert and oriented [Normal memory] : normal memory

## 2024-01-24 NOTE — HISTORY OF PRESENT ILLNESS
[FreeTextEntry1] : 58 year old male, occasional cigar smoker with PMHX of HTN ( not medicated ) with FMHX of MI / DM ( father passed 75 yo ) after LHC - LAD 60%, non-obstructive by iFR, FFR (0.83)

## 2024-01-24 NOTE — REVIEW OF SYSTEMS
[Fever] : no fever [Blurry Vision] : no blurred vision [Chills] : no chills [Earache] : no earache [Dyspnea on exertion] : dyspnea during exertion [Chest Discomfort] : no chest discomfort [Lower Ext Edema] : no extremity edema [Palpitations] : no palpitations [Cough] : no cough [Abdominal Pain] : no abdominal pain [Nausea] : no nausea [Vomiting] : no vomiting [Diarrhea] : diarrhea [Urinary Frequency] : no change in urinary frequency [Joint Pain] : no joint pain [Rash] : no rash [Dizziness] : no dizziness [Numbness (Hypoesthesia)] : no numbness [Speech Disturbance] : no speech disturbance [Weakness] : no weakness [Easy Bleeding] : no tendency for easy bleeding [Confusion] : no confusion was observed

## 2024-01-25 PROBLEM — I10 ESSENTIAL (PRIMARY) HYPERTENSION: Chronic | Status: ACTIVE | Noted: 2024-01-11

## 2024-04-24 ENCOUNTER — APPOINTMENT (OUTPATIENT)
Dept: HEART AND VASCULAR | Facility: CLINIC | Age: 59
End: 2024-04-24
Payer: COMMERCIAL

## 2024-04-24 VITALS
SYSTOLIC BLOOD PRESSURE: 127 MMHG | DIASTOLIC BLOOD PRESSURE: 80 MMHG | HEIGHT: 71 IN | WEIGHT: 201 LBS | TEMPERATURE: 97.1 F | HEART RATE: 71 BPM | BODY MASS INDEX: 28.14 KG/M2 | OXYGEN SATURATION: 98 %

## 2024-04-24 DIAGNOSIS — R06.09 OTHER FORMS OF DYSPNEA: ICD-10-CM

## 2024-04-24 DIAGNOSIS — I25.118 ATHEROSCLEROTIC HEART DISEASE OF NATIVE CORONARY ARTERY WITH OTHER FORMS OF ANGINA PECTORIS: ICD-10-CM

## 2024-04-24 DIAGNOSIS — I10 ESSENTIAL (PRIMARY) HYPERTENSION: ICD-10-CM

## 2024-04-24 DIAGNOSIS — R73.02 IMPAIRED GLUCOSE TOLERANCE (ORAL): ICD-10-CM

## 2024-04-24 PROCEDURE — 99214 OFFICE O/P EST MOD 30 MIN: CPT | Mod: 25

## 2024-04-24 PROCEDURE — 93000 ELECTROCARDIOGRAM COMPLETE: CPT

## 2024-04-24 NOTE — DISCUSSION/SUMMARY
[FreeTextEntry1] : 58 year old male, occasional cigar smoker with PMHX of HTN ( not medicated ) with FMHX of MI / DM ( father passed 73 yo ) here to establish care.  EKG: NSR 71/min CCTA:  Total Agatston Score: 113 LHC - mLAD 60%, FFR 0.83  CAD- cont primary prevention - cont ASA 81mg po daily cont metoprolol 25mg po daily. HTN: cont losartan 50mg po daily. HLD: cont rosuvastatin 40mg po daily RTC 6 months

## 2024-04-24 NOTE — HISTORY OF PRESENT ILLNESS
[FreeTextEntry1] : Patient is occasional cigar smoker with PMHX of HTN ( not medicated ) with FMHX of MI / DM ( father passed 73 yo ) after LHC - LAD 60%, non-obstructive by iFR, FFR (0.83)

## 2024-04-25 LAB
ALBUMIN SERPL ELPH-MCNC: 4.7 G/DL
ALP BLD-CCNC: 67 U/L
ALT SERPL-CCNC: 43 U/L
ANION GAP SERPL CALC-SCNC: 12 MMOL/L
AST SERPL-CCNC: 23 U/L
BILIRUB SERPL-MCNC: 0.8 MG/DL
BUN SERPL-MCNC: 16 MG/DL
CALCIUM SERPL-MCNC: 9.6 MG/DL
CHLORIDE SERPL-SCNC: 101 MMOL/L
CHOLEST SERPL-MCNC: 102 MG/DL
CO2 SERPL-SCNC: 26 MMOL/L
CREAT SERPL-MCNC: 0.83 MG/DL
EGFR: 100 ML/MIN/1.73M2
ESTIMATED AVERAGE GLUCOSE: 103 MG/DL
GLUCOSE SERPL-MCNC: 79 MG/DL
HBA1C MFR BLD HPLC: 5.2 %
HCT VFR BLD CALC: 44 %
HDLC SERPL-MCNC: 46 MG/DL
HGB BLD-MCNC: 14.6 G/DL
LDLC SERPL CALC-MCNC: 40 MG/DL
MCHC RBC-ENTMCNC: 27.8 PG
MCHC RBC-ENTMCNC: 33.2 GM/DL
MCV RBC AUTO: 83.8 FL
NONHDLC SERPL-MCNC: 56 MG/DL
PLATELET # BLD AUTO: 196 K/UL
POTASSIUM SERPL-SCNC: 4.3 MMOL/L
PROT SERPL-MCNC: 6.8 G/DL
RBC # BLD: 5.25 M/UL
RBC # FLD: 13.6 %
SODIUM SERPL-SCNC: 139 MMOL/L
TRIGL SERPL-MCNC: 81 MG/DL
WBC # FLD AUTO: 7.24 K/UL

## 2024-10-30 ENCOUNTER — APPOINTMENT (OUTPATIENT)
Dept: HEART AND VASCULAR | Facility: CLINIC | Age: 59
End: 2024-10-30
Payer: COMMERCIAL

## 2024-10-30 VITALS
DIASTOLIC BLOOD PRESSURE: 76 MMHG | TEMPERATURE: 97.4 F | SYSTOLIC BLOOD PRESSURE: 124 MMHG | HEIGHT: 71 IN | WEIGHT: 201 LBS | OXYGEN SATURATION: 98 % | HEART RATE: 65 BPM | BODY MASS INDEX: 28.14 KG/M2

## 2024-10-30 DIAGNOSIS — N52.9 MALE ERECTILE DYSFUNCTION, UNSPECIFIED: ICD-10-CM

## 2024-10-30 DIAGNOSIS — I25.118 ATHEROSCLEROTIC HEART DISEASE OF NATIVE CORONARY ARTERY WITH OTHER FORMS OF ANGINA PECTORIS: ICD-10-CM

## 2024-10-30 DIAGNOSIS — I10 ESSENTIAL (PRIMARY) HYPERTENSION: ICD-10-CM

## 2024-10-30 PROCEDURE — 93000 ELECTROCARDIOGRAM COMPLETE: CPT

## 2024-10-30 PROCEDURE — 99214 OFFICE O/P EST MOD 30 MIN: CPT | Mod: 25

## 2024-10-30 RX ORDER — TADALAFIL 20 MG/1
20 TABLET ORAL
Qty: 12 | Refills: 6 | Status: ACTIVE | COMMUNITY
Start: 2024-10-30 | End: 1900-01-01

## 2024-10-30 RX ORDER — ELECTROLYTES/DEXTROSE
SOLUTION, ORAL ORAL
Refills: 0 | Status: ACTIVE | COMMUNITY

## 2024-10-31 LAB
ALBUMIN SERPL ELPH-MCNC: 4.7 G/DL
ALP BLD-CCNC: 67 U/L
ALT SERPL-CCNC: 38 U/L
ANION GAP SERPL CALC-SCNC: 13 MMOL/L
AST SERPL-CCNC: 25 U/L
BILIRUB SERPL-MCNC: 0.8 MG/DL
BUN SERPL-MCNC: 14 MG/DL
CALCIUM SERPL-MCNC: 9.8 MG/DL
CHLORIDE SERPL-SCNC: 101 MMOL/L
CHOLEST SERPL-MCNC: 110 MG/DL
CO2 SERPL-SCNC: 26 MMOL/L
CREAT SERPL-MCNC: 0.85 MG/DL
EGFR: 99 ML/MIN/1.73M2
ESTIMATED AVERAGE GLUCOSE: 103 MG/DL
GLUCOSE SERPL-MCNC: 76 MG/DL
HBA1C MFR BLD HPLC: 5.2 %
HCT VFR BLD CALC: 47 %
HDLC SERPL-MCNC: 48 MG/DL
HGB BLD-MCNC: 15.5 G/DL
LDLC SERPL CALC-MCNC: 47 MG/DL
MCHC RBC-ENTMCNC: 28.3 PG
MCHC RBC-ENTMCNC: 33 G/DL
MCV RBC AUTO: 85.9 FL
NONHDLC SERPL-MCNC: 62 MG/DL
PLATELET # BLD AUTO: 185 K/UL
POTASSIUM SERPL-SCNC: 4.9 MMOL/L
PROT SERPL-MCNC: 6.9 G/DL
RBC # BLD: 5.47 M/UL
RBC # FLD: 13.5 %
SODIUM SERPL-SCNC: 140 MMOL/L
TRIGL SERPL-MCNC: 68 MG/DL
WBC # FLD AUTO: 6.85 K/UL

## 2025-04-23 ENCOUNTER — APPOINTMENT (OUTPATIENT)
Dept: HEART AND VASCULAR | Facility: CLINIC | Age: 60
End: 2025-04-23
Payer: COMMERCIAL

## 2025-04-23 ENCOUNTER — NON-APPOINTMENT (OUTPATIENT)
Age: 60
End: 2025-04-23

## 2025-04-23 VITALS
WEIGHT: 195 LBS | HEIGHT: 71 IN | TEMPERATURE: 97.6 F | BODY MASS INDEX: 27.3 KG/M2 | OXYGEN SATURATION: 98 % | HEART RATE: 64 BPM | SYSTOLIC BLOOD PRESSURE: 112 MMHG | DIASTOLIC BLOOD PRESSURE: 68 MMHG

## 2025-04-23 DIAGNOSIS — I10 ESSENTIAL (PRIMARY) HYPERTENSION: ICD-10-CM

## 2025-04-23 DIAGNOSIS — I25.118 ATHEROSCLEROTIC HEART DISEASE OF NATIVE CORONARY ARTERY WITH OTHER FORMS OF ANGINA PECTORIS: ICD-10-CM

## 2025-04-23 PROCEDURE — 93000 ELECTROCARDIOGRAM COMPLETE: CPT

## 2025-04-23 PROCEDURE — 99214 OFFICE O/P EST MOD 30 MIN: CPT | Mod: 25

## 2025-04-24 LAB
ALBUMIN SERPL ELPH-MCNC: 4.8 G/DL
ALP BLD-CCNC: 69 U/L
ALT SERPL-CCNC: 71 U/L
ANION GAP SERPL CALC-SCNC: 15 MMOL/L
AST SERPL-CCNC: 27 U/L
BASOPHILS # BLD AUTO: 0.04 K/UL
BASOPHILS NFR BLD AUTO: 0.6 %
BILIRUB SERPL-MCNC: 0.6 MG/DL
BUN SERPL-MCNC: 14 MG/DL
CALCIUM SERPL-MCNC: 9.6 MG/DL
CHLORIDE SERPL-SCNC: 105 MMOL/L
CHOLEST SERPL-MCNC: 109 MG/DL
CO2 SERPL-SCNC: 22 MMOL/L
CREAT SERPL-MCNC: 0.78 MG/DL
EGFRCR SERPLBLD CKD-EPI 2021: 101 ML/MIN/1.73M2
EOSINOPHIL # BLD AUTO: 0.06 K/UL
EOSINOPHIL NFR BLD AUTO: 0.9 %
ESTIMATED AVERAGE GLUCOSE: 108 MG/DL
GLUCOSE SERPL-MCNC: 88 MG/DL
HBA1C MFR BLD HPLC: 5.4 %
HCT VFR BLD CALC: 43.7 %
HDLC SERPL-MCNC: 46 MG/DL
HGB BLD-MCNC: 14.6 G/DL
IMM GRANULOCYTES NFR BLD AUTO: 0.4 %
LDLC SERPL-MCNC: 43 MG/DL
LYMPHOCYTES # BLD AUTO: 1.42 K/UL
LYMPHOCYTES NFR BLD AUTO: 20.5 %
MAN DIFF?: NORMAL
MCHC RBC-ENTMCNC: 28.6 PG
MCHC RBC-ENTMCNC: 33.4 G/DL
MCV RBC AUTO: 85.7 FL
MONOCYTES # BLD AUTO: 0.55 K/UL
MONOCYTES NFR BLD AUTO: 7.9 %
NEUTROPHILS # BLD AUTO: 4.82 K/UL
NEUTROPHILS NFR BLD AUTO: 69.7 %
NONHDLC SERPL-MCNC: 63 MG/DL
PLATELET # BLD AUTO: 199 K/UL
POTASSIUM SERPL-SCNC: 4.3 MMOL/L
PROT SERPL-MCNC: 7.1 G/DL
RBC # BLD: 5.1 M/UL
RBC # FLD: 13.3 %
SODIUM SERPL-SCNC: 142 MMOL/L
TRIGL SERPL-MCNC: 110 MG/DL
WBC # FLD AUTO: 6.92 K/UL

## 2025-06-30 ENCOUNTER — RX RENEWAL (OUTPATIENT)
Age: 60
End: 2025-06-30

## 2025-06-30 RX ORDER — ASPIRIN 81 MG/1
81 TABLET, CHEWABLE ORAL
Qty: 90 | Refills: 3 | Status: ACTIVE | COMMUNITY
Start: 2025-06-30 | End: 1900-01-01

## 2025-08-06 ENCOUNTER — RX RENEWAL (OUTPATIENT)
Age: 60
End: 2025-08-06